# Patient Record
Sex: FEMALE
[De-identification: names, ages, dates, MRNs, and addresses within clinical notes are randomized per-mention and may not be internally consistent; named-entity substitution may affect disease eponyms.]

---

## 2018-01-08 ENCOUNTER — HOSPITAL ENCOUNTER (EMERGENCY)
Dept: HOSPITAL 31 - C.ER | Age: 68
Discharge: HOME | End: 2018-01-08
Payer: COMMERCIAL

## 2018-01-08 VITALS
HEART RATE: 85 BPM | DIASTOLIC BLOOD PRESSURE: 75 MMHG | OXYGEN SATURATION: 96 % | RESPIRATION RATE: 18 BRPM | SYSTOLIC BLOOD PRESSURE: 121 MMHG | TEMPERATURE: 97.5 F

## 2018-01-08 DIAGNOSIS — M62.830: Primary | ICD-10-CM

## 2018-01-08 LAB
BACTERIA #/AREA URNS HPF: (no result) /[HPF]
BILIRUB UR-MCNC: NEGATIVE MG/DL
GLUCOSE UR STRIP-MCNC: NORMAL MG/DL
LEUKOCYTE ESTERASE UR-ACNC: (no result) LEU/UL
PH UR STRIP: 6 [PH] (ref 5–8)
PROT UR STRIP-MCNC: NEGATIVE MG/DL
RBC # UR STRIP: (no result) /UL
SP GR UR STRIP: 1.02 (ref 1–1.03)
SQUAMOUS EPITHIAL: 1 /HPF (ref 0–5)
URINE NITRATE: NEGATIVE
UROBILINOGEN UR-MCNC: NORMAL MG/DL (ref 0.2–1)

## 2018-01-08 PROCEDURE — 96372 THER/PROPH/DIAG INJ SC/IM: CPT

## 2018-01-08 PROCEDURE — 99284 EMERGENCY DEPT VISIT MOD MDM: CPT

## 2018-01-08 PROCEDURE — 87086 URINE CULTURE/COLONY COUNT: CPT

## 2018-01-08 PROCEDURE — 81001 URINALYSIS AUTO W/SCOPE: CPT

## 2018-01-08 NOTE — C.PDOC
History Of Present Illness


67 yr old female with PMHx of chronic back pain, presents to the ER for 

exacerbation of back pain for the past 3-4 days. Patient states the pain is in 

the right lower back, radiating down to the right hip and leg. Patient reports 

she has been doing lots of heavy lifting and movement, exacerbating the 

symptoms. Patient states she has been taking Naproxen and Flexeril with 

moderate relief. Patient states the symptoms are improving but however the pain 

persists, prompting the visit today. Patient denies direct trauma, fever, nausea

, vomiting, abdominal pain, dysuria, incontinence, weakness or numbness. 


Time Seen by Provider: 01/08/18 15:23


Chief Complaint (Nursing): Back Pain


History Per: Patient


History/Exam Limitations: no limitations


Onset/Duration Of Symptoms: Days (3-4)





Past Medical History


Reviewed: Historical Data, Nursing Documentation, Vital Signs


Vital Signs: 


 Last Vital Signs











Temp  97.5 F L  01/08/18 15:17


 


Pulse  85   01/08/18 15:17


 


Resp  18   01/08/18 15:17


 


BP  121/75   01/08/18 15:17


 


Pulse Ox  96   01/08/18 17:31














- Medical History


PMH: HTN, Hypothyroidism


Family History: States: No Known Family Hx





- Social History


Hx Alcohol Use: No


Hx Substance Use: No





- Immunization History


Hx Tetanus Toxoid Vaccination: No


Hx Influenza Vaccination: Yes


Hx Pneumococcal Vaccination: No





Review Of Systems


Except As Marked, All Systems Reviewed And Found Negative.


Constitutional: Negative for: Fever


Gastrointestinal: Negative for: Nausea, Vomiting, Abdominal Pain


Genitourinary: Negative for: Dysuria, Incontinence


Musculoskeletal: Positive for: Back Pain (right lower back pain radiating to 

right hip and leg)


Neurological: Negative for: Weakness, Numbness





Physical Exam





- Physical Exam


Appears: Non-toxic, No Acute Distress


Skin: Warm, Dry, No Rash


Head: Atraumatic, Normacephalic


Eye(s): bilateral: Normal Inspection, PERRL, EOMI


Oral Mucosa: Moist


Neck: Normal, Normal ROM, No Midline Cervical Tenderness, No Paracervical 

Tenderness, Supple


Chest: Symmetrical, No Tenderness


Cardiovascular: Rhythm Regular, No Friction Rub, No Murmur


Respiratory: Normal Breath Sounds, No Rales, No Rhonchi, No Stridor, No Wheezing


Gastrointestinal/Abdominal: Normal Exam, Soft, No Tenderness, No Guarding, No 

Rebound


Back: No CVA Tenderness, Muscle Spasm (muscle spasm to the paralumbar region 

with tenderness)


Extremity: Normal ROM, No Swelling


Neurological/Psych: Oriented x3, Normal Speech, Normal Motor, Normal Sensation


Gait: Steady





ED Course And Treatment


O2 Sat by Pulse Oximetry: 96 (RA)


Pulse Ox Interpretation: Normal





Medical Decision Making


Medical Decision Making: 


On re-exam, the patient reports improvement of symptoms. Lungs are CTA, heart 

is RRR, abdomen is soft, non-tender and the patient is tolerating PO well. 

Ambulatory in the ED with steady gait. Follow up with the medical doctor/clinic 

within 1-2 days. Return if worsened. 





Disposition





- Disposition


Referrals: 


Stef Duffy MD [Non-Staff] - 


Disposition: HOME/ ROUTINE


Disposition Time: 17:28


Condition: GOOD


Additional Instructions: 


Follow up with the medical doctor/clinic within 1-2 days. Return if worsened. 


Prescriptions: 


Lidocaine 5% [Lidoderm] 1 each TP DAILY #10 patch


Naproxen [Naprosyn] 500 mg PO BID #20 tab


traMADol [Ultram] 50 mg PO Q6 PRN #10 tab


 PRN Reason: Pain


Instructions:  Acute Low Back Pain (ED)


Forms:  [facility] (English), Work Excuse





- Clinical Impression


Clinical Impression: 


 Back spasm








- PA / NP / Resident Statement


MD/DO has reviewed & agrees with the documentation as recorded.





- Scribe Statement


The provider has reviewed the documentation as recorded by the Scribe


Sue Stevens





All medical record entries made by the Scribe were at my direction and 

personally dictated by me. I have reviewed the chart and agree that the record 

accurately reflects my personal performance of the history, physical exam, 

medical decision making, and the department course for this patient. I have 

also personally directed, reviewed, and agree with the discharge instructions 

and disposition.